# Patient Record
Sex: FEMALE | Race: WHITE
[De-identification: names, ages, dates, MRNs, and addresses within clinical notes are randomized per-mention and may not be internally consistent; named-entity substitution may affect disease eponyms.]

---

## 2021-04-04 ENCOUNTER — HOSPITAL ENCOUNTER (EMERGENCY)
Dept: HOSPITAL 53 - M ED | Age: 46
Discharge: HOME | End: 2021-04-04
Payer: COMMERCIAL

## 2021-04-04 VITALS — BODY MASS INDEX: 24.13 KG/M2 | HEIGHT: 66 IN | WEIGHT: 150.13 LBS

## 2021-04-04 VITALS — SYSTOLIC BLOOD PRESSURE: 104 MMHG | DIASTOLIC BLOOD PRESSURE: 58 MMHG

## 2021-04-04 DIAGNOSIS — J45.909: Primary | ICD-10-CM

## 2021-04-04 LAB
HCG UR QL: NEGATIVE
RSV RNA NPH QL NAA+PROBE: NEGATIVE

## 2021-04-04 RX ADMIN — ALBUTEROL SULFATE ONE PUFF: 90 AEROSOL, METERED RESPIRATORY (INHALATION) at 06:43

## 2021-04-04 NOTE — REP
INDICATION:

wheezing.



COMPARISON:

None



TECHNIQUE:

Upright PA and lateral chest.



FINDINGS:

The lung fields are clear.

Cardiac size is normal.

The anum, mediastinum and skeletal structures are unremarkable.





IMPRESSION:

Essentially negative PA and lateral chest





<Electronically signed by Carlos Durant > 04/04/21 0734